# Patient Record
Sex: FEMALE | Race: WHITE | Employment: UNEMPLOYED | URBAN - METROPOLITAN AREA
[De-identification: names, ages, dates, MRNs, and addresses within clinical notes are randomized per-mention and may not be internally consistent; named-entity substitution may affect disease eponyms.]

---

## 2019-11-20 PROBLEM — M47.816 SPONDYLOSIS OF LUMBAR REGION WITHOUT MYELOPATHY OR RADICULOPATHY: Status: ACTIVE | Noted: 2019-10-31

## 2019-11-20 PROBLEM — E28.2 PCOS (POLYCYSTIC OVARIAN SYNDROME): Status: ACTIVE | Noted: 2019-10-31

## 2019-11-20 PROBLEM — G47.33 OSA ON CPAP: Status: ACTIVE | Noted: 2019-10-31

## 2019-11-20 PROBLEM — E78.2 MIXED HYPERCHOLESTEROLEMIA AND HYPERTRIGLYCERIDEMIA: Status: ACTIVE | Noted: 2019-11-07

## 2019-11-20 PROBLEM — M79.7 CHRONIC FATIGUE SYNDROME WITH FIBROMYALGIA: Status: ACTIVE | Noted: 2019-10-31

## 2019-11-20 PROBLEM — Z99.89 OSA ON CPAP: Status: ACTIVE | Noted: 2019-10-31

## 2019-11-20 PROBLEM — G56.03 BILATERAL CARPAL TUNNEL SYNDROME: Status: ACTIVE | Noted: 2019-10-31

## 2019-11-20 PROBLEM — L40.50 PSORIATIC ARTHRITIS (HCC): Status: ACTIVE | Noted: 2019-10-31

## 2019-11-20 PROBLEM — G93.32 CHRONIC FATIGUE SYNDROME WITH FIBROMYALGIA: Status: ACTIVE | Noted: 2019-10-31

## 2019-11-20 PROBLEM — R60.9 EDEMA, PERIPHERAL: Status: ACTIVE | Noted: 2019-10-31

## 2019-11-20 PROBLEM — N92.1 MENOMETRORRHAGIA: Status: ACTIVE | Noted: 2019-11-20

## 2019-11-20 PROBLEM — E66.01 CLASS 2 SEVERE OBESITY DUE TO EXCESS CALORIES WITH SERIOUS COMORBIDITY AND BODY MASS INDEX (BMI) OF 37.0 TO 37.9 IN ADULT (HCC): Status: ACTIVE | Noted: 2019-10-31

## 2019-11-20 PROBLEM — Z79.631 ON METHOTREXATE THERAPY: Status: ACTIVE | Noted: 2019-11-06

## 2019-11-20 PROBLEM — G43.701 CHRONIC MIGRAINE WITHOUT AURA WITH STATUS MIGRAINOSUS, NOT INTRACTABLE: Status: ACTIVE | Noted: 2019-10-31

## 2020-01-17 VITALS — BODY MASS INDEX: 36.65 KG/M2 | HEIGHT: 65 IN | WEIGHT: 220 LBS

## 2020-01-17 RX ORDER — TOPIRAMATE 100 MG/1
100 TABLET, FILM COATED ORAL
COMMUNITY

## 2020-01-17 RX ORDER — SUMATRIPTAN 100 MG/1
100 TABLET, FILM COATED ORAL
COMMUNITY

## 2020-01-17 RX ORDER — ASCORBIC ACID 500 MG
500 TABLET ORAL DAILY
COMMUNITY

## 2020-01-17 RX ORDER — ACETAMINOPHEN 325 MG/1
650 TABLET ORAL
COMMUNITY

## 2020-01-17 RX ORDER — ONDANSETRON 8 MG/1
8 TABLET, ORALLY DISINTEGRATING ORAL
COMMUNITY
End: 2020-06-03 | Stop reason: ALTCHOICE

## 2020-01-17 NOTE — PERIOP NOTES
Patient verified name and . Order for consent not found in EHR. Type 1B surgery, PAT phone assessment complete. Orders not received. Labs per surgeon: no orders received. Labs per anesthesia protocol: Hgb- Pt instructed to come for lab work (Monday- Friday 8:30 AM- 4:00 PM) prior to surgery day. Pt voiced an understanding. Patient answered medical/surgical history questions at their best of ability. All prior to admission medications documented in [facility]. Patient instructed to take the following medications the day of surgery according to anesthesia guidelines with a small sip of water: robaxin, tramadol if needed. Hold all vitamins/supplements 7 days prior to surgery and NSAIDS 5 days prior to surgery. Patient instructed on the following:  Arrive at A Entrance, time of arrival to be called the day before by 1700  NPO after midnight including gum, mints, and ice chips  Responsible adult must drive patient to the hospital, stay during surgery, and patient will need supervision 24 hours after anesthesia  Use antibacterial soap in shower the night before surgery and on the morning of surgery  All piercings must be removed prior to arrival.    Leave all valuables (money and jewelry) at home but bring insurance card and ID on  DOS. Do not wear make-up, nail polish, lotions, cologne, perfumes, powders, or oil on skin. Patient teach back successful and patient demonstrates knowledge of instruction.

## 2020-01-21 ENCOUNTER — HOSPITAL ENCOUNTER (OUTPATIENT)
Dept: SURGERY | Age: 35
Discharge: HOME | End: 2020-01-21

## 2020-01-22 ENCOUNTER — HOSPITAL ENCOUNTER (OUTPATIENT)
Dept: LAB | Age: 35
Discharge: HOME | End: 2020-01-22
Payer: COMMERCIAL

## 2020-01-22 LAB — HGB BLD-MCNC: 12.4 G/DL (ref 11.7–15.4)

## 2020-01-22 PROCEDURE — 36415 COLL VENOUS BLD VENIPUNCTURE: CPT

## 2020-01-22 PROCEDURE — 85018 HEMOGLOBIN: CPT

## 2020-01-22 NOTE — PERIOP NOTES
Lab results within limits per anesthesia protocol; OK for surgery.      Recent Results (from the past 12 hour(s))   HEMOGLOBIN    Collection Time: 01/22/20 12:25 PM   Result Value Ref Range    HGB 12.4 11.7 - 15.4 g/dL

## 2020-01-27 ENCOUNTER — ANESTHESIA EVENT (OUTPATIENT)
Dept: SURGERY | Age: 35
End: 2020-01-27
Payer: COMMERCIAL

## 2020-01-27 RX ORDER — SODIUM CHLORIDE 0.9 % (FLUSH) 0.9 %
5-40 SYRINGE (ML) INJECTION AS NEEDED
Status: CANCELLED | OUTPATIENT
Start: 2020-01-27

## 2020-01-27 RX ORDER — MIDAZOLAM HYDROCHLORIDE 1 MG/ML
2 INJECTION, SOLUTION INTRAMUSCULAR; INTRAVENOUS ONCE
Status: CANCELLED | OUTPATIENT
Start: 2020-01-27 | End: 2020-01-27

## 2020-01-27 RX ORDER — SODIUM CHLORIDE 0.9 % (FLUSH) 0.9 %
5-40 SYRINGE (ML) INJECTION EVERY 8 HOURS
Status: CANCELLED | OUTPATIENT
Start: 2020-01-27

## 2020-01-27 RX ORDER — LORAZEPAM 2 MG/ML
1 INJECTION INTRAMUSCULAR
Status: CANCELLED | OUTPATIENT
Start: 2020-01-27

## 2020-01-27 NOTE — H&P
Gynecology History and Physical    Name: Ken Bettencourt MRN: [id number] SSN: [id number]    YOB: 1985  Age: 29 y.o. Sex: female       Subjective:      Chief complaint:  Menorrhagia and Truman Mcclelland is a 29 y.o. female with a history of abnormal uterine bleeding. Previous workup included Ultrasound which was unremarkable. Previous treatment measures included nonsteroidal anti-inflammatory drugs (NSAID's), oral contraceptives and progesterone intrauterine device. She is admitted for Procedure(s) (LRB):  DILATATION AND CURETTAGE HYSTEROSCOPY ENDOMETRIAL ABLATION/ ANNA (N/A). The current method of family planning is none.     OB History        6    Para   5    Term   4       1    AB   1    Living   5       SAB   1    TAB        Ectopic        Molar        Multiple        Live Births   5              Past Medical History:   Diagnosis Date    Anemia     Carpal tunnel syndrome     Chronic fatigue     Chronic pain     \"head to toe\", back pain     DDD (degenerative disc disease), lumbar     l4, l5, spinal stenosis    Depression with anxiety     Fibromyalgia     GERD (gastroesophageal reflux disease)     Managed with OTC meds PRN     Low back pain due to bilateral sciatica     Migraines     daily     Miscarriage     D&C    OA (osteoarthritis)     PCOS (polycystic ovarian syndrome)     Psoriasis     Psoriatic arthritis (HCC)     Sleep apnea     C-pap     Vitamin B 12 deficiency     Vitamin D deficiency      Past Surgical History:   Procedure Laterality Date    HX  SECTION      x3    HX DILATION AND CURETTAGE      MAB    HX ORTHOPAEDIC  2017, 2018    bilat ulnar nerve    HX OTHER SURGICAL      Bladder injections     LAP,TUBAL CAUTERY       Social History     Occupational History    Not on file   Tobacco Use    Smoking status: Former Smoker     Packs/day: 2.00     Years: 16.00     Pack years: 32.00     Last attempt to quit: 2009     Years since quittin.0    Smokeless tobacco: Never Used   Substance and Sexual Activity    Alcohol use: Never     Frequency: Never    Drug use: Never    Sexual activity: Not Currently     Partners: Male     Birth control/protection: Surgical     Comment: Tubal     Family History   Problem Relation Age of Onset    Other Mother         ortho issues    Diabetes Mother         prediabetes    Anxiety Mother     Depression Mother     Diabetes Father     Hypertension Father     Other Father         ortho issues    Other Sister         bleeding disorder        Allergies   Allergen Reactions    Ibuprofen Swelling     Prior to Admission medications    Medication Sig Start Date End Date Taking? Authorizing Provider   topiramate (TOPAMAX) 100 mg tablet Take 100 mg by mouth nightly. Provider, Historical   acetaminophen (TYLENOL) 325 mg tablet Take 650 mg by mouth every four (4) hours as needed for Pain. Provider, Historical   ascorbic acid, vitamin C, (VITAMIN C) 500 mg tablet Take 500 mg by mouth daily. Provider, Historical   ondansetron (ZOFRAN ODT) 8 mg disintegrating tablet Take 8 mg by mouth every eight (8) hours as needed for Nausea or Vomiting. Provider, Historical   SUMAtriptan (IMITREX) 100 mg tablet Take 100 mg by mouth once as needed for Migraine. Provider, Historical   nystatin-triamcinolone (MYCOLOG II) topical cream Apply  to affected area two (2) times a day. Patient taking differently: Apply  to affected area two (2) times daily as needed. 19   Hudson Ch MD   methocarbamol (ROBAXIN-750) 750 mg tablet Take 750 mg by mouth four (4) times daily. Provider, Historical   butalbital-acetaminophen-caff (FIORICET) -40 mg per capsule Take  by mouth every four (4) hours as needed for Pain. Provider, Historical   cholecalciferol, VITAMIN D3, (VITAMIN D3) 5,000 unit tab tablet Take 1,000 Units by mouth daily.     Provider, Historical   traMADol (ULTRAM) 50 mg tablet Take 50 mg by mouth every six (6) hours as needed for Pain. Provider, Historical        Review of Systems:  A comprehensive review of systems was negative except for that written in the History of Present Illness. Objective: There were no vitals filed for this visit. Physical Exam:  Patient without distress. Heart: Regular rate and rhythm  Lung: clear to auscultation throughout lung fields, no wheezes, no rales, no rhonchi and normal respiratory effort  Back: costovertebral angle tenderness absent  Abdomen: soft, nontender  External Genitalia: normal general appearance  Vagina: normal mucosa without prolapse or lesions  Cervix: normal appearance  Adnexa: normal bimanual exam  Uterus: normal single, nontender    Assessment:     Principal Problem:    Menometrorrhagia (11/20/2019)       Abnormal uterine bleeding with menorrhagia    Plan:     Procedure(s) (LRB):  DILATATION AND CURETTAGE HYSTEROSCOPY ENDOMETRIAL ABLATION/ ANNA (N/A)  Discussed the risks of surgery including the risks of bleeding, infection, deep vein thrombosis, and surgical injuries to internal organs including but not limited to the bowels, bladder, rectum, and female reproductive organs. The patient understands the risks; any and all questions were answered to the patient's satisfaction.

## 2020-01-28 ENCOUNTER — ANESTHESIA (OUTPATIENT)
Dept: SURGERY | Age: 35
End: 2020-01-28
Payer: COMMERCIAL

## 2020-01-28 ENCOUNTER — HOSPITAL ENCOUNTER (OUTPATIENT)
Age: 35
Setting detail: OUTPATIENT SURGERY
Discharge: HOME | End: 2020-01-28
Attending: OBSTETRICS & GYNECOLOGY | Admitting: OBSTETRICS & GYNECOLOGY
Payer: COMMERCIAL

## 2020-01-28 VITALS
RESPIRATION RATE: 14 BRPM | HEIGHT: 65 IN | HEART RATE: 80 BPM | BODY MASS INDEX: 37.34 KG/M2 | SYSTOLIC BLOOD PRESSURE: 108 MMHG | WEIGHT: 224.1 LBS | DIASTOLIC BLOOD PRESSURE: 76 MMHG | OXYGEN SATURATION: 96 % | TEMPERATURE: 97.5 F

## 2020-01-28 DIAGNOSIS — N92.1 MENOMETRORRHAGIA: Primary | ICD-10-CM

## 2020-01-28 LAB
ERYTHROCYTE [DISTWIDTH] IN BLOOD BY AUTOMATED COUNT: 13.4 % (ref 11.9–14.6)
HCG UR QL: NEGATIVE
HCT VFR BLD AUTO: 39.1 % (ref 35.8–46.3)
HGB BLD-MCNC: 12.8 G/DL (ref 11.7–15.4)
MCH RBC QN AUTO: 29.8 PG (ref 26.1–32.9)
MCHC RBC AUTO-ENTMCNC: 32.7 G/DL (ref 31.4–35)
MCV RBC AUTO: 90.9 FL (ref 79.6–97.8)
NRBC # BLD: 0 K/UL (ref 0–0.2)
PLATELET # BLD AUTO: 228 K/UL (ref 150–450)
PMV BLD AUTO: 11.9 FL (ref 9.4–12.3)
RBC # BLD AUTO: 4.3 M/UL (ref 4.05–5.2)
WBC # BLD AUTO: 9.1 K/UL (ref 4.3–11.1)

## 2020-01-28 PROCEDURE — 76210000006 HC OR PH I REC 0.5 TO 1 HR: Performed by: OBSTETRICS & GYNECOLOGY

## 2020-01-28 PROCEDURE — 76010000138 HC OR TIME 0.5 TO 1 HR: Performed by: OBSTETRICS & GYNECOLOGY

## 2020-01-28 PROCEDURE — 81025 URINE PREGNANCY TEST: CPT

## 2020-01-28 PROCEDURE — 76210000020 HC REC RM PH II FIRST 0.5 HR: Performed by: OBSTETRICS & GYNECOLOGY

## 2020-01-28 PROCEDURE — 76060000032 HC ANESTHESIA 0.5 TO 1 HR: Performed by: OBSTETRICS & GYNECOLOGY

## 2020-01-28 PROCEDURE — 77030040922 HC BLNKT HYPOTHRM STRY -A: Performed by: ANESTHESIOLOGY

## 2020-01-28 PROCEDURE — 77030039606 HC HNDPC DISP THERMABLTN MINE -G1: Performed by: OBSTETRICS & GYNECOLOGY

## 2020-01-28 PROCEDURE — 88305 TISSUE EXAM BY PATHOLOGIST: CPT

## 2020-01-28 PROCEDURE — 77030019927 HC TBNG IRR CYSTO BAXT -A: Performed by: OBSTETRICS & GYNECOLOGY

## 2020-01-28 PROCEDURE — 74011000250 HC RX REV CODE- 250: Performed by: NURSE ANESTHETIST, CERTIFIED REGISTERED

## 2020-01-28 PROCEDURE — 77030010509 HC AIRWY LMA MSK TELE -A: Performed by: ANESTHESIOLOGY

## 2020-01-28 PROCEDURE — 85027 COMPLETE CBC AUTOMATED: CPT

## 2020-01-28 PROCEDURE — 74011250636 HC RX REV CODE- 250/636: Performed by: NURSE ANESTHETIST, CERTIFIED REGISTERED

## 2020-01-28 PROCEDURE — 74011000250 HC RX REV CODE- 250: Performed by: ANESTHESIOLOGY

## 2020-01-28 PROCEDURE — 77030018836 HC SOL IRR NACL ICUM -A: Performed by: OBSTETRICS & GYNECOLOGY

## 2020-01-28 PROCEDURE — 74011250636 HC RX REV CODE- 250/636: Performed by: ANESTHESIOLOGY

## 2020-01-28 RX ORDER — DEXAMETHASONE SODIUM PHOSPHATE 4 MG/ML
INJECTION, SOLUTION INTRA-ARTICULAR; INTRALESIONAL; INTRAMUSCULAR; INTRAVENOUS; SOFT TISSUE AS NEEDED
Status: DISCONTINUED | OUTPATIENT
Start: 2020-01-28 | End: 2020-01-28 | Stop reason: HOSPADM

## 2020-01-28 RX ORDER — ONDANSETRON 2 MG/ML
INJECTION INTRAMUSCULAR; INTRAVENOUS AS NEEDED
Status: DISCONTINUED | OUTPATIENT
Start: 2020-01-28 | End: 2020-01-28 | Stop reason: HOSPADM

## 2020-01-28 RX ORDER — SODIUM CHLORIDE, SODIUM LACTATE, POTASSIUM CHLORIDE, CALCIUM CHLORIDE 600; 310; 30; 20 MG/100ML; MG/100ML; MG/100ML; MG/100ML
75 INJECTION, SOLUTION INTRAVENOUS CONTINUOUS
Status: DISCONTINUED | OUTPATIENT
Start: 2020-01-28 | End: 2020-01-28 | Stop reason: HOSPADM

## 2020-01-28 RX ORDER — HYDROCODONE BITARTRATE AND ACETAMINOPHEN 5; 325 MG/1; MG/1
1 TABLET ORAL
Qty: 18 TAB | Refills: 0 | Status: SHIPPED | OUTPATIENT
Start: 2020-01-28 | End: 2020-01-31

## 2020-01-28 RX ORDER — FENTANYL CITRATE 50 UG/ML
INJECTION, SOLUTION INTRAMUSCULAR; INTRAVENOUS AS NEEDED
Status: DISCONTINUED | OUTPATIENT
Start: 2020-01-28 | End: 2020-01-28 | Stop reason: HOSPADM

## 2020-01-28 RX ORDER — SODIUM CHLORIDE, SODIUM LACTATE, POTASSIUM CHLORIDE, CALCIUM CHLORIDE 600; 310; 30; 20 MG/100ML; MG/100ML; MG/100ML; MG/100ML
50 INJECTION, SOLUTION INTRAVENOUS CONTINUOUS
Status: DISCONTINUED | OUTPATIENT
Start: 2020-01-28 | End: 2020-01-28 | Stop reason: HOSPADM

## 2020-01-28 RX ORDER — LIDOCAINE HYDROCHLORIDE 10 MG/ML
0.1 INJECTION INFILTRATION; PERINEURAL AS NEEDED
Status: DISCONTINUED | OUTPATIENT
Start: 2020-01-28 | End: 2020-01-28 | Stop reason: HOSPADM

## 2020-01-28 RX ORDER — ALBUTEROL SULFATE 0.83 MG/ML
2.5 SOLUTION RESPIRATORY (INHALATION) AS NEEDED
Status: DISCONTINUED | OUTPATIENT
Start: 2020-01-28 | End: 2020-01-28 | Stop reason: HOSPADM

## 2020-01-28 RX ORDER — HYDROMORPHONE HYDROCHLORIDE 2 MG/ML
0.5 INJECTION, SOLUTION INTRAMUSCULAR; INTRAVENOUS; SUBCUTANEOUS
Status: DISCONTINUED | OUTPATIENT
Start: 2020-01-28 | End: 2020-01-28 | Stop reason: HOSPADM

## 2020-01-28 RX ORDER — PROPOFOL 10 MG/ML
INJECTION, EMULSION INTRAVENOUS AS NEEDED
Status: DISCONTINUED | OUTPATIENT
Start: 2020-01-28 | End: 2020-01-28 | Stop reason: HOSPADM

## 2020-01-28 RX ORDER — FENTANYL CITRATE 50 UG/ML
100 INJECTION, SOLUTION INTRAMUSCULAR; INTRAVENOUS ONCE
Status: DISCONTINUED | OUTPATIENT
Start: 2020-01-28 | End: 2020-01-28 | Stop reason: HOSPADM

## 2020-01-28 RX ORDER — MIDAZOLAM HYDROCHLORIDE 1 MG/ML
2 INJECTION, SOLUTION INTRAMUSCULAR; INTRAVENOUS
Status: COMPLETED | OUTPATIENT
Start: 2020-01-28 | End: 2020-01-28

## 2020-01-28 RX ORDER — OXYCODONE HYDROCHLORIDE 5 MG/1
5 TABLET ORAL
Status: DISCONTINUED | OUTPATIENT
Start: 2020-01-28 | End: 2020-01-28 | Stop reason: HOSPADM

## 2020-01-28 RX ORDER — LIDOCAINE HYDROCHLORIDE 20 MG/ML
INJECTION, SOLUTION EPIDURAL; INFILTRATION; INTRACAUDAL; PERINEURAL AS NEEDED
Status: DISCONTINUED | OUTPATIENT
Start: 2020-01-28 | End: 2020-01-28 | Stop reason: HOSPADM

## 2020-01-28 RX ADMIN — PROPOFOL 200 MG: 10 INJECTION, EMULSION INTRAVENOUS at 09:24

## 2020-01-28 RX ADMIN — MIDAZOLAM 2 MG: 1 INJECTION INTRAMUSCULAR; INTRAVENOUS at 08:01

## 2020-01-28 RX ADMIN — HYDROMORPHONE HYDROCHLORIDE 0.5 MG: 2 INJECTION INTRAMUSCULAR; INTRAVENOUS; SUBCUTANEOUS at 10:16

## 2020-01-28 RX ADMIN — LIDOCAINE HYDROCHLORIDE 40 MG: 20 INJECTION, SOLUTION EPIDURAL; INFILTRATION; INTRACAUDAL; PERINEURAL at 09:25

## 2020-01-28 RX ADMIN — FENTANYL CITRATE 50 MCG: 50 INJECTION INTRAMUSCULAR; INTRAVENOUS at 09:21

## 2020-01-28 RX ADMIN — ONDANSETRON 4 MG: 2 INJECTION INTRAMUSCULAR; INTRAVENOUS at 09:33

## 2020-01-28 RX ADMIN — LIDOCAINE HYDROCHLORIDE 0.1 ML: 10 INJECTION, SOLUTION INFILTRATION; PERINEURAL at 07:41

## 2020-01-28 RX ADMIN — HYDROMORPHONE HYDROCHLORIDE 0.5 MG: 2 INJECTION INTRAMUSCULAR; INTRAVENOUS; SUBCUTANEOUS at 10:22

## 2020-01-28 RX ADMIN — LIDOCAINE HYDROCHLORIDE 60 MG: 20 INJECTION, SOLUTION EPIDURAL; INFILTRATION; INTRACAUDAL; PERINEURAL at 09:24

## 2020-01-28 RX ADMIN — DEXAMETHASONE SODIUM PHOSPHATE 5 MG: 4 INJECTION, SOLUTION INTRAMUSCULAR; INTRAVENOUS at 09:33

## 2020-01-28 RX ADMIN — SODIUM CHLORIDE, SODIUM LACTATE, POTASSIUM CHLORIDE, AND CALCIUM CHLORIDE: 600; 310; 30; 20 INJECTION, SOLUTION INTRAVENOUS at 09:48

## 2020-01-28 RX ADMIN — FENTANYL CITRATE 50 MCG: 50 INJECTION INTRAMUSCULAR; INTRAVENOUS at 09:23

## 2020-01-28 RX ADMIN — SODIUM CHLORIDE, SODIUM LACTATE, POTASSIUM CHLORIDE, AND CALCIUM CHLORIDE 75 ML/HR: 600; 310; 30; 20 INJECTION, SOLUTION INTRAVENOUS at 07:41

## 2020-01-28 NOTE — DISCHARGE INSTRUCTIONS
Patient Education        Endometrial Ablation: What to Expect at Home  Your Recovery  Endometrial ablation is a procedure to treat very heavy menstrual bleeding or other abnormal bleeding in the uterus. During ablation, the lining of the uterus is destroyed. The lining heals by scarring. The scarring reduces or prevents bleeding. You may have cramps and vaginal bleeding for several days. You may also have watery vaginal discharge mixed with blood for a few days. It may take a few days to 2 weeks to recover. This care sheet gives you a general idea about how long it will take for you to recover. But each person recovers at a different pace. Follow the steps below to feel better as quickly as possible. How can you care for yourself at home? Activity    · Rest when you feel tired. Getting enough sleep will help you recover.     · Most women are able to return to work on the day after the procedure.     · You may shower and take baths as usual.     · Ask your doctor when it is okay for you to have sex. Diet    · You can eat your normal diet. If your stomach is upset, try bland, low-fat foods like plain rice, broiled chicken, toast, and yogurt.     · You may notice that your bowel movements are not regular right after the procedure. This is common. Try to avoid constipation and straining with bowel movements. You may want to take a fiber supplement every day. If you have not had a bowel movement after a couple of days, ask your doctor about taking a mild laxative. Medicines    · Your doctor will tell you if and when you can restart your medicines. He or she will also give you instructions about taking any new medicines.     · If you take blood thinners, such as warfarin (Coumadin), clopidogrel (Plavix), or aspirin, be sure to talk to your doctor. He or she will tell you if and when to start taking those medicines again.  Make sure that you understand exactly what your doctor wants you to do.     · Take pain medicines exactly as directed. ? If the doctor gave you a prescription medicine for pain, take it as prescribed. ? If you are not taking a prescription pain medicine, ask your doctor if you can take an over-the-counter medicine.     · If you think your pain medicine is making you sick to your stomach:  ? Take your medicine after meals (unless your doctor has told you not to). ? Ask your doctor for a different pain medicine.     · If your doctor prescribed antibiotics, take them as directed. Do not stop taking them just because you feel better. You need to take the full course of antibiotics. Other instructions    · You may have some light vaginal bleeding. Wear sanitary pads if needed. Do not douche or use tampons until your doctor says it is okay.     · You may want to use a heating pad on your belly to help with pain. Use a low heat setting.     · Talk with your doctor about birth control. Endometrial ablation usually causes infertility, but pregnancy may still be possible. And the pregnancy could have severe problems. Follow-up care is a key part of your treatment and safety. Be sure to make and go to all appointments, and call your doctor if you are having problems. It's also a good idea to know your test results and keep a list of the medicines you take. When should you call for help? Call 911 anytime you think you may need emergency care. For example, call if:    · You passed out (lost consciousness).     · You have chest pain, are short of breath, or cough up blood.    Call your doctor now or seek immediate medical care if:    · You have pain that does not get better after you take pain medicine.     · You cannot pass stools or gas.     · You have vaginal discharge that has increased in amount or smells bad.     · You are sick to your stomach or cannot drink fluids.     · You have signs of infection, such as:  ? Increased pain, swelling, warmth, or redness.   ? A fever.     · You have bright red vaginal bleeding that soaks one or more pads in an hour, or you have large clots.     · You have signs of a blood clot in your leg (called a deep vein thrombosis), such as:  ? Pain in your calf, back of the knee, thigh, or groin. ? Redness and swelling in your leg.    Watch closely for any changes in your health, and be sure to contact your doctor if you have any problems. Where can you learn more? Go to http://kenneth-mary.info/. Enter D063 in the search box to learn more about \"Endometrial Ablation: What to Expect at Home. \"  Current as of: February 19, 2019  Content Version: 12.2  © 7087-1548 [employer]. Care instructions adapted under license by [employer] (which disclaims liability or warranty for this information). If you have questions about a medical condition or this instruction, always ask your healthcare professional. [place] [address] any warranty or liability for your use of this information. Activity    · If you think your pain medicine is making you sick to your stomach:  ? Take your medicine after meals (unless your doctor has told you not to). ? Ask your doctor for a different pain medicine. Incision care    · If you have strips of tape on the cut (incision) the doctor made, leave the tape on for a week or until it falls off. Or follow your doctor's instructions for removing the tape.     · Wash the area daily with warm, soapy water, and pat it dry. Don't use hydrogen peroxide or alcohol, which can slow healing. You may cover the area with a gauze bandage if it weeps or rubs against clothing. Change the bandage every day.     · Keep the area clean and dry. Other instructions    · You may have some light vaginal bleeding. Wear sanitary pads if needed. Do not douche or use tampons. Follow-up care is a key part of your treatment and safety.  Be sure to make and go to all appointments, and call your doctor if you are having problems. It's also a good idea to know your test results and keep a list of the medicines you take. When should you call for help? Call 911 anytime you think you may need emergency care. For example, call if:    · You passed out (lost consciousness).     · You have chest pain, are short of breath, or cough up blood.    Call your doctor now or seek immediate medical care if:    · You have pain that does not get better after you take pain medicine.     · You cannot pass stools or gas.     · You have vaginal discharge that has increased in amount or smells bad.     · You are sick to your stomach or cannot drink fluids.     · You have loose stitches, or your incision comes open.     · Bright red blood has soaked through the bandage over your incision.     · You have signs of infection, such as:  ? Increased pain, swelling, warmth, or redness. ? Red streaks leading from the incision. ? Pus draining from the incision. ? A fever.     · You have bright red vaginal bleeding that soaks one or more pads in an hour, or you have large clots.     · You have signs of a blood clot in your leg (called a deep vein thrombosis), such as:  ? Pain in your calf, back of the knee, thigh, or groin. ? Redness and swelling in your leg.    Watch closely for changes in your health, and be sure to contact your doctor if you have any problems. Where can you learn more? Go to http://kenneth-mary.info/. Enter M280 in the search box to learn more about \"Abdominal Hysterectomy: What to Expect at Home. \"  Current as of: February 19, 2019  Content Version: 12.2  © 2363-8158 [employer]. Care instructions adapted under license by [employer] (which disclaims liability or warranty for this information).  If you have questions about a medical condition or this instruction, always ask your healthcare professional. [address] any warranty or liability for your use of this information. After general anesthesia or intravenous sedation, for 24 hours or while taking prescription Narcotics:  · Limit your activities  · A responsible adult needs to be with you for the next 24 hours  · Do not drive and operate hazardous machinery  · Do not make important personal or business decisions  · Do  not drink alcoholic beverages  · If you have not urinated within 8 hours after discharge, please contact your surgeon on call. · If you have sleep apnea and have a CPAP machine, please use it for all naps and sleeping. · Please use caution when taking narcotics and any of your home medications that may cause drowsiness. *  Please give a list of your current medications to your Primary Care Provider. *  Please update this list whenever your medications are discontinued, doses are      changed, or new medications (including over-the-counter products) are added. *  Please carry medication information at all times in case of emergency situations. These are general instructions for a healthy lifestyle:  No smoking/ No tobacco products/ Avoid exposure to second hand smoke  Surgeon General's Warning:  Quitting smoking now greatly reduces serious risk to your health. Obesity, smoking, and sedentary lifestyle greatly increases your risk for illness  A healthy diet, regular physical exercise & weight monitoring are important for maintaining a healthy lifestyle    You may be retaining fluid if you have a history of heart failure or if you experience any of the following symptoms:  Weight gain of 3 pounds or more overnight or 5 pounds in a week, increased swelling in our hands or feet or shortness of breath while lying flat in bed. Please call your doctor as soon as you notice any of these symptoms; do not wait until your next office visit.

## 2020-01-28 NOTE — BRIEF OP NOTE
BRIEF OPERATIVE NOTE    Date of Procedure: 1/28/2020   Preoperative Diagnosis: Irregular menstrual cycle [N92.6]  Menometrorrhagia [N92.1]  Postoperative Diagnosis: Irregular menstrual cycle [N92.6]  Menometrorrhagia [N92.1]    Procedure(s):  DILATATION AND CURETTAGE HYSTEROSCOPY ENDOMETRIAL ABLATION/ ANNA  Surgeon(s) and Role:     Jocelin Head MD - Primary         Surgical Assistant: none    Surgical Staff:  Circ-1: Madhuri Pearson RN  Circ-Relief: Chasidy Lee RN  Scrub Tech-1: Marina CEDEÑO  Event Time In Time Out   Incision Start 2762    Incision Close 5715      Anesthesia: General   Estimated Blood Loss: 5 cc  Specimens:   ID Type Source Tests Collected by Time Destination   1 : endometrial currettings Preservative   Tasneem Rivers MD 1/28/2020 9183 Pathology      Findings: Normal EGBSUVVC. Normal endometrial cavity.      Complications: none

## 2020-01-28 NOTE — ANESTHESIA POSTPROCEDURE EVALUATION
Procedure(s):  DILATATION AND CURETTAGE HYSTEROSCOPY ENDOMETRIAL ABLATION/ ANNA.     general    Anesthesia Post Evaluation      Multimodal analgesia: multimodal analgesia used between 6 hours prior to anesthesia start to PACU discharge  Patient location during evaluation: PACU  Patient participation: complete - patient participated  Level of consciousness: awake  Pain management: adequate  Airway patency: patent  Anesthetic complications: no  Cardiovascular status: acceptable  Respiratory status: spontaneous ventilation and acceptable  Hydration status: acceptable  Post anesthesia nausea and vomiting:  none      Vitals Value Taken Time   /76 1/28/2020 10:41 AM   Temp 36.4 °C (97.5 °F) 1/28/2020 10:07 AM   Pulse 80 1/28/2020 10:41 AM   Resp 14 1/28/2020 10:34 AM   SpO2 96 % 1/28/2020 10:41 AM

## 2020-01-28 NOTE — ANESTHESIA PREPROCEDURE EVALUATION
Relevant Problems   No relevant active problems       Anesthetic History   No history of anesthetic complications            Review of Systems / Medical History  Patient summary reviewed, nursing notes reviewed and pertinent labs reviewed    Pulmonary        Sleep apnea: CPAP           Neuro/Psych         Headaches (chronic migraines) and psychiatric history     Cardiovascular  Within defined limits                Exercise tolerance: >4 METS     GI/Hepatic/Renal     GERD: well controlled           Endo/Other        Obesity and arthritis (psoriatic arthritis)     Other Findings   Comments: PCOS  Fibromyalgia         Physical Exam    Airway  Mallampati: I    Neck ROM: normal range of motion   Mouth opening: Normal     Cardiovascular  Regular rate and rhythm,  S1 and S2 normal,  no murmur, click, rub, or gallop             Dental  No notable dental hx       Pulmonary  Breath sounds clear to auscultation               Abdominal         Other Findings            Anesthetic Plan    ASA: 2  Anesthesia type: general          Induction: Intravenous  Anesthetic plan and risks discussed with: Patient

## 2024-12-21 NOTE — OP NOTES
[address] [address]  OPERATIVE REPORT    Name:  Gerhard Carrel  MR#:  [id number]  :  1985  ACCOUNT #:  [de-identified]  DATE OF SERVICE:  2020    PREOPERATIVE DIAGNOSIS:  Menometrorrhagia. POSTOPERATIVE DIAGNOSIS:  Menometrorrhagia. PROCEDURES PERFORMED:  Hysteroscopy, dilatation and curettage with Michelle endometrial ablation. SURGEON:  Ashley Smith MD    ASSISTANT:  none. ANESTHESIA:  General anesthesia. COMPLICATIONS:  None. SPECIMENS REMOVED:  Endometrial curettings. ESTIMATED BLOOD LOSS:  5 mL. IV FLUIDS:  500 mL of crystalloid. URINE OUTPUT:  100 mL of clear urine drained prior to the case. FINDINGS:  Normal appearing external genitalia, [place], [place], urethral glands, vulva, vagina, and cervix. Normal appearing endometrial cavity with minimal amount of endometrial curettings collected. PROCEDURE:  The patient was taken to the operating room, where general anesthesia was obtained without difficulty. She was then sterilely prepped and draped in dorsal lithotomy position in [address] [place] [address] in the usual sterile fashion. After confirming the patient's identification and the nature of the procedure, a weighted sterile speculum was placed in the patient's posterior fornix of the vagina and the anterior lip of the cervix was visualized with a narrow [place]. It was grasped with a single-tooth tenaculum and the narrow [place] was removed. The uterus sounded to a depth of 9 cm. It was noted to be spontaneously dilated to a diameter of 21-[place] without the need for mechanical dilatation. The cavity length was determined to be 6 cm with the cervical length determined to be 3 cm using the Michelle measuring dilator. The hysteroscope was then introduced into the endometrial cavity with the above findings appreciated.   After the hysteroscope was removed, sharp curettage was undertaken until a gritty texture was appreciated throughout the entire endometrial cavity. The hysteroscope was then reintroduced without difficulty with no evidence of uterine perforation noted at that point. Subsequently, the Michelle endometrial ablation device was introduced into the endometrial cavity as indicated. The parameters were set perfectly and the ablation undertaken in usual fashion without complication or difficulty. The device was removed. It was intact on inspection. Single-tooth tenaculum was removed from the cervix with minimal bleeding noted from the tenaculum site. The patient tolerated the procedure well. All counts were correct. The patient went to recovery room in stable condition.       MD CHARLENE Jaramillo/[id number]  D:  01/28/2020 10:05  T:  01/28/2020 13:28  JOB #:  [id number] Patient/Caregiver provided printed discharge information.

## (undated) DEVICE — JELLY LUBRICATING 10GM PREFIL SYR LUBE

## (undated) DEVICE — HANDPIECE ELECSURG THERM ABLATN DISP

## (undated) DEVICE — PAD MATERNITY 11IN W/TAILS -- STRL

## (undated) DEVICE — TRAY PREP DRY W/ PREM GLV 2 APPL 6 SPNG 2 UNDPD 1 OVERWRAP

## (undated) DEVICE — CARDINAL HEALTH FLEXIBLE LIGHT HANDLE COVER: Brand: CARDINAL HEALTH

## (undated) DEVICE — PAD,NON-ADHERENT,3X8,STERILE,LF,1/PK: Brand: MEDLINE

## (undated) DEVICE — CYSTO/BLADDER IRRIGATION SET, REGULATING CLAMP

## (undated) DEVICE — DRAPE,UNDERBUTTOCKS,PCH,STERILE: Brand: MEDLINE

## (undated) DEVICE — GOWN,REINF,POLY,ECL,PP SLV,XL: Brand: MEDLINE

## (undated) DEVICE — SOLUTION IRRIG 3000ML 0.9% SOD CHL FLX CONT 0797208] ICU MEDICAL INC]

## (undated) DEVICE — MINOR LITHOTOMY PACK: Brand: MEDLINE INDUSTRIES, INC.

## (undated) DEVICE — CONTAINER SPEC FRMLN 120ML --

## (undated) DEVICE — DRAPE TWL SURG 16X26IN BLU ORB04] ALLCARE INC]